# Patient Record
(demographics unavailable — no encounter records)

---

## 2025-01-12 NOTE — ASSESSMENT
[FreeTextEntry1] : Zpak Tessalon PRN F/U if symptoms do not resolve, or if they should change/worsen.

## 2025-01-12 NOTE — REVIEW OF SYSTEMS
[Shortness Of Breath] : no shortness of breath [Wheezing] : no wheezing [Cough] : cough [Negative] : Genitourinary

## 2025-01-12 NOTE — HISTORY OF PRESENT ILLNESS
[FreeTextEntry8] : Pt presents for evaluation of URI symptoms/ cough/ nasal congestion for several days. No fever/chills no wheezing/SOB

## 2025-01-26 NOTE — HISTORY OF PRESENT ILLNESS
[Home] : at home, [unfilled] , at the time of the visit. [Medical Office: ([facility])___] : at the medical office located in  [Verbal consent obtained from patient] : the patient, [unfilled] [de-identified] : pt presents for f/u of URI no help with zpak/ tessalon still with congestion no fever/chills

## 2025-01-26 NOTE — ASSESSMENT
[FreeTextEntry1] : likely postviral syndrome trial of Bromfed Flonase F/U if symptoms do not resolve, or if they should change/worsen.

## 2025-02-28 NOTE — HISTORY OF PRESENT ILLNESS
[de-identified] : Pt presents for evaluation - has been suffering with frequent headaches for several weeks as well as vertigo intermittently for several months. While in [place] this week, pt suffered with tremor in right hand while holding a cup -lasted for several minutes.  Recurred the next day. Two episodes total.  doesnt feel like herself

## 2025-02-28 NOTE — ASSESSMENT
[FreeTextEntry1] : Blood work was drawn and sent to the lab today. The patient has been instructed to call the office next week to discuss today's lab work.  MRI head to r/o mass / intracranial process  consider neurology evaluation

## 2025-03-10 NOTE — HISTORY OF PRESENT ILLNESS
[Spouse] : spouse [FreeTextEntry8] : Pt presents for f/u evaluation  did MRI brain - showed chronic microvascular ischemic changes but was other wise ok. Revealed acute sinusitis. pt has not fully recovered from URI from last month.

## 2025-03-10 NOTE — ASSESSMENT
[FreeTextEntry1] : Labs reviewed MRI reviewed for Carotid Sonogram Begin Augmentin bid for ten days Flonase two sprays per nostril daily ENT evaluation for chronic sinusitis.

## 2025-04-27 NOTE — ASSESSMENT
[FreeTextEntry1] : Blood work was drawn and sent to the lab today. The patient has been instructed to call the office next week to discuss today's lab work.  Continue lexapro  Bone Density - due Annual OBGYN/ Mammogram  Optho evaluation  Increase exercise / weight loss F/U 2 months Consider GLP1  Consider Shingrix - pt will check insurance coverage   [Obese (BMI >29.9)] : Obese - BMI >29.9 [Weight loss counseling given] : Weight loss counseling given [150 min/wk aerobic activity @ 60% MHR recommended] : 150 min/wk aerobic activity @ 60% MHR recommended [Non - Smoker] : non-smoker

## 2025-04-27 NOTE — HEALTH RISK ASSESSMENT
[Excellent] : ~his/her~  mood as  excellent [No] : In the past 12 months have you used drugs other than those required for medical reasons? No [No falls in past year] : Patient reported no falls in the past year [0] : 2) Feeling down, depressed, or hopeless: Not at all (0) [PHQ-2 Negative - No further assessment needed] : PHQ-2 Negative - No further assessment needed [[id number]] : 0 [Never] : Never [Patient reported mammogram was normal] : Patient reported mammogram was normal [Patient reported PAP Smear was normal] : Patient reported PAP Smear was normal [Patient reported bone density results were normal] : Patient reported bone density results were normal [HIV test declined] : HIV test declined [Hepatitis C test declined] : Hepatitis C test declined [MammogramDate] : 06/19 [PapSmearDate] : 06/19 [BoneDensityDate] : 06/19

## 2025-04-27 NOTE — HISTORY OF PRESENT ILLNESS
[Health Maintenance] : health maintenance [___ Year(s) Ago] : [unfilled] year(s) ago [Good] : good [Reg. Dental Visits] : She has regular dental visits [Vision Problems] : She complains of vision problems [Glasses] : wearing glasses [Eye Exam > 1 Year] : an eye examination more than a year ago [Hearing Loss] : She denies hearing loss [Healthy Diet] : She consumes a diverse and healthy diet [Weight Concerns] : She does not have any weight concerns [Regular Exercise] : She exercises regularly [Less than 3 times/wk] : less than three times a week [Walking] : walking [Tobacco Use] : She does not use tobacco [Alcohol Use] : She denies alcohol use [Drug Abuse] : She denies drug use [Postmenopausal] : the patient is postmenopausal [Reviewed and Updated] : risk screening reviewed and updated [Up to Date] : up to date [FreeTextEntry1] : Pt presents for her annual physical as well as f/u for hypothyroidism, asthma, and osteoporosis  no other acute complaints.  Admits to depression / anxiety - lexapro has helped. Does not want to change dose however wants to add something to help suppress her appetitie  Tentatively scheduled for breast surgery in June- waiting for insurance approval

## 2025-04-27 NOTE — PHYSICAL EXAM
[General Appearance - Alert] : alert [General Appearance - In No Acute Distress] : in no acute distress [Sclera] : the sclera and conjunctiva were normal [Outer Ear] : the ears and nose were normal in appearance [Oropharynx] : the oropharynx was normal [Neck Appearance] : the appearance of the neck was normal [Neck Cervical Mass (___cm)] : no neck mass was observed [Jugular Venous Distention Increased] : there was no jugular-venous distention [Thyroid Diffuse Enlargement] : the thyroid was not enlarged [Thyroid Nodule] : there were no palpable thyroid nodules [Auscultation Breath Sounds / Voice Sounds] : lungs were clear to auscultation bilaterally [Heart Rate And Rhythm] : heart rate was normal and rhythm regular [Heart Sounds] : normal S1 and S2 [Heart Sounds Gallop] : no gallops [Murmurs] : no murmurs [Heart Sounds Pericardial Friction Rub] : no pericardial rub [Edema] : there was no peripheral edema [Breast Appearance] : normal in appearance [Breast Palpation Mass] : no palpable masses [Breast Abnormal Lactation (Galactorrhea)] : no nipple discharge [Bowel Sounds] : normal bowel sounds [Abdomen Soft] : soft [Abdomen Tenderness] : non-tender [Abdomen Mass (___ Cm)] : no abdominal mass palpated [FreeTextEntry1] : KEYANA Coates PAP 9/19 neg [Cervical Lymph Nodes Enlarged Posterior Bilaterally] : posterior cervical [Cervical Lymph Nodes Enlarged Anterior Bilaterally] : anterior cervical [Supraclavicular Lymph Nodes Enlarged Bilaterally] : supraclavicular [No CVA Tenderness] : no ~M costovertebral angle tenderness [No Spinal Tenderness] : no spinal tenderness [Abnormal Walk] : normal gait [Nail Clubbing] : no clubbing  or cyanosis of the fingernails [Musculoskeletal - Swelling] : no joint swelling seen [Motor Tone] : muscle strength and tone were normal [Skin Color & Pigmentation] : normal skin color and pigmentation [Skin Turgor] : normal skin turgor [] : no rash [Deep Tendon Reflexes (DTR)] : deep tendon reflexes were 2+ and symmetric [Sensation] : the sensory exam was normal to light touch and pinprick [No Focal Deficits] : no focal deficits [Oriented To Time, Place, And Person] : oriented to person, place, and time [Impaired Insight] : insight and judgment were intact [Affect] : the affect was normal

## 2025-04-29 NOTE — ASSESSMENT
[FreeTextEntry1] : The pathophysiology as well as medical implications of untreated obstructive sleep apnea syndrome were discussed with this patient. Treatment options including CPAP therapy, Inspire,  mandibular advancement device and weight loss were also discussed. Course of prednisone. Course of zithro. cont. breo Evaluate for JUAN  PRN beta agonist. Followup post sleep study or sooner PRN.

## 2025-04-29 NOTE — REVIEW OF SYSTEMS
[Fatigue] : fatigue [Postnasal Drip] : postnasal drip [Cough] : cough [Chest Tightness] : chest tightness [Dyspnea] : dyspnea [Wheezing] : wheezing [Negative] : Endocrine [Fever] : no fever [Sore Throat] : no sore throat [TextBox_14] : runny nose  [TextBox_30] : chest congestion

## 2025-04-29 NOTE — DISCUSSION/SUMMARY
[FreeTextEntry1] : Fatigue.  Rule out sleep apnea syndrome. Deviated septum.  Has seen ENT. Asthma with increased symptom complex likely related to respiratory infection. social smoker in 20's

## 2025-04-29 NOTE — PHYSICAL EXAM
[No Acute Distress] : no acute distress [Normal Oropharynx] : normal oropharynx [Normal Appearance] : normal appearance [No Neck Mass] : no neck mass [Normal Rate/Rhythm] : normal rate/rhythm [Normal S1, S2] : normal s1, s2 [No Murmurs] : no murmurs [No Resp Distress] : no resp distress [Clear to Auscultation Bilaterally] : clear to auscultation bilaterally [Normal to Percussion] : normal to percussion [No Abnormalities] : no abnormalities [Benign] : benign [Normal Gait] : normal gait [No Clubbing] : no clubbing [No Cyanosis] : no cyanosis [No Edema] : no edema [FROM] : FROM [Normal Color/ Pigmentation] : normal color/ pigmentation [No Focal Deficits] : no focal deficits [Oriented x3] : oriented x3 [Normal Affect] : normal affect [TextBox_11] : Significant upper airway congestion.

## 2025-04-29 NOTE — HISTORY OF PRESENT ILLNESS
[Former] : former [Awakes Unrefreshed] : awakes unrefreshed [Fatigue] : fatigue [Frequent Nocturnal Awakening] : frequent nocturnal awakening [Snoring] : snoring [Unintentional Sleep while Inactive] : unintentional sleep while inactive [TextBox_4] : Developed cough last week.  Went to ENT and given steroids. Medrol candelario.  No antibiotics. Positive cough, wheeze and phlegm Feels in throat and upper chest.  Not improved Clear mucous No fever.  Feels tight. Also now wants HSS. No DM.  Issues acute on chronic           [TextBox_11] : 1 [TextBox_13] : 10 [YearQuit] : 1990

## 2025-04-29 NOTE — REASON FOR VISIT
[Follow-Up] : a follow-up visit [Cough] : cough [Shortness of Breath] : shortness of breath [TextBox_44] : cough

## 2025-04-29 NOTE — PROCEDURE
[FreeTextEntry1] : 04/28/2025 Pulmonary function testing Mild primarily restrictive ventilatory impairment without significant bronchodilator response. Lung volumes are notable for normal TLC with decreased expiratory reserve volume. Normal diffusion capacity.

## 2025-05-30 NOTE — HISTORY OF PRESENT ILLNESS
Device check.      [de-identified] : Pt presents for f/u evaluation of obesity. has been taking Zepbound for 3 weeks. has lost about 4 pounds slight constipation. (2) cough or sneeze

## 2025-05-30 NOTE — HISTORY OF PRESENT ILLNESS
[de-identified] : Pt presents for f/u evaluation of obesity. has been taking Zepbound for 3 weeks. has lost about 4 pounds slight constipation.

## 2025-05-30 NOTE — ASSESSMENT
[FreeTextEntry1] : continue current dose of Zepbound Pt to f/u one month to discuss possible titration. continue all other medications as prescribed

## 2025-06-20 NOTE — HISTORY OF PRESENT ILLNESS
[Former] : former [Awakes Unrefreshed] : awakes unrefreshed [Fatigue] : fatigue [Frequent Nocturnal Awakening] : frequent nocturnal awakening [Snoring] : snoring [Unintentional Sleep while Inactive] : unintentional sleep while inactive [TextBox_4] : Here for HSS results does not want to repeat spirometry. feels better after steroids and antibiotics cough is resolved. on breo  has  been doing bid No DM.  Issues acute on chronic           [TextBox_11] : 1 [TextBox_13] : 10 [YearQuit] : 1990

## 2025-06-20 NOTE — PROCEDURE
[FreeTextEntry1] : 04/28/2025 Pulmonary function testing Mild primarily restrictive ventilatory impairment without significant bronchodilator response. Lung volumes are notable for normal TLC with decreased expiratory reserve volume. Normal diffusion capacity.  hss with mils Srinivasa ahi 6

## 2025-06-20 NOTE — ASSESSMENT
[FreeTextEntry1] : advice not to gain weight and sleep a on side.  Positional therapy discussed. cont. breo daily only go to pcp next week and will get referral to cardio re HSS with dysrhythmia wants to get his referral PRN beta agonist. r/t 6 months

## 2025-06-20 NOTE — DISCUSSION/SUMMARY
[FreeTextEntry1] : Fatigue. mild Srinivasa ahi 6 Possible nocturnal arrhythmia. Deviated septum.  Has seen ENT. Asthma with increased symptom complex likely related to respiratory infection. social smoker in 20's

## 2025-06-27 NOTE — HISTORY OF PRESENT ILLNESS
[de-identified] : Pt presents for f/u evaluation of obesity. has been taking Zepbound and has lost about 10 pounds slight constipation.  had sleep study - had ?nocturnal arrhythmia

## 2025-06-27 NOTE — ASSESSMENT
[FreeTextEntry1] : increase zepbound 5mg weekly  nasal saline  mupirocin ointment for 5 days for nose sores  cardiology evaluation for evaluation for arrhythmia names given  f/u 4-6 weeks

## 2025-07-02 NOTE — HISTORY OF PRESENT ILLNESS
[FreeTextEntry1] : 64-year-old female being seen in cardiac evaluation after she was noted to have a heart rate of 35 on a recent sleep study.  She has no prior history of heart disease and no history of syncope or presyncope.  She reports she is physically active walking up to 4 miles per day.  She has a history of asthma and obesity and has some intermittent dyspnea.  She is feeling generally well at present.  She is also hypothyroid with her most recent TSH 2.97.

## 2025-07-02 NOTE — DISCUSSION/SUMMARY
[FreeTextEntry1] : Ms Macias is asymptomatic.  Her exam shows a heart rate of about 80, regular rhythm, clear lungs, normal cardiac exam.  An EKG done at her internist office in April was within normal limits.  I am unsure what to make of this report of heart rate of 35.  She has no particular reason to suspect she would have any bradycardia.  She will be monitored for a few days in order to see if a bradycardia is present.